# Patient Record
Sex: MALE | Race: WHITE | NOT HISPANIC OR LATINO | Employment: PART TIME | URBAN - METROPOLITAN AREA
[De-identification: names, ages, dates, MRNs, and addresses within clinical notes are randomized per-mention and may not be internally consistent; named-entity substitution may affect disease eponyms.]

---

## 2019-11-01 ENCOUNTER — OFFICE VISIT (OUTPATIENT)
Dept: UROLOGY | Facility: CLINIC | Age: 24
End: 2019-11-01
Payer: MEDICAID

## 2019-11-01 VITALS
BODY MASS INDEX: 34.16 KG/M2 | SYSTOLIC BLOOD PRESSURE: 130 MMHG | HEIGHT: 60 IN | DIASTOLIC BLOOD PRESSURE: 80 MMHG | RESPIRATION RATE: 16 BRPM | WEIGHT: 174 LBS | HEART RATE: 90 BPM

## 2019-11-01 DIAGNOSIS — N39.0 RECURRENT UTI (URINARY TRACT INFECTION): Primary | ICD-10-CM

## 2019-11-01 DIAGNOSIS — R30.0 DYSURIA: ICD-10-CM

## 2019-11-01 PROCEDURE — 99204 OFFICE O/P NEW MOD 45 MIN: CPT | Mod: 25,S$GLB,, | Performed by: SPECIALIST

## 2019-11-01 PROCEDURE — 81000 URINALYSIS NONAUTO W/SCOPE: CPT | Mod: S$GLB,,, | Performed by: SPECIALIST

## 2019-11-01 PROCEDURE — 81000 POCT URINALYSIS (W/MICRO OPTION): ICD-10-PCS | Mod: S$GLB,,, | Performed by: SPECIALIST

## 2019-11-01 PROCEDURE — 99204 PR OFFICE/OUTPT VISIT, NEW, LEVL IV, 45-59 MIN: ICD-10-PCS | Mod: 25,S$GLB,, | Performed by: SPECIALIST

## 2019-11-01 RX ORDER — SUMATRIPTAN 50 MG/1
TABLET, FILM COATED ORAL
Refills: 0 | COMMUNITY
Start: 2019-10-21

## 2019-11-01 RX ORDER — LEVOTHYROXINE SODIUM 75 UG/1
75 TABLET ORAL DAILY
COMMUNITY

## 2019-11-01 NOTE — PROGRESS NOTES
Subjective:       Patient ID: Geetha Gabriel is a 24 y.o. male.    Chief Complaint: Urinary Tract Infection (Pt reports issues with recurrent UTIs since 5 years of age. Pt reports after each abx  treatment UTI symptoms always return. Pt reports burning, frequency, and urgency. Pt reports approximately 15-20 UTIs this past year. )      HPI:  24-year-old female has been dealing with urine current urinary tract infections all her life she states that at age 7 years old she was hospitalized for pyelonephritis.  She says that when he was she was 17 years old she used to use condoms for sexual activity and her UTIs got worse.    When she has a urinary tract infection she experiences some burning with urination urinary frequencies and some lower back pain.  She feels that way today been do her urine does not look infected at all.    She reports some constipation which she deals with on routine basis    She denies any barrier contraceptives no douching and incontinence    Past Medical History:   Past Medical History:   Diagnosis Date    Asthma     Hypoglycemia     Hypothyroid     Migraines        Past Surgical Historical:   Past Surgical History:   Procedure Laterality Date    ADENOIDECTOMY       SECTION      x2    CHOLECYSTECTOMY      TONSILLECTOMY      TYMPANOSTOMY TUBE PLACEMENT          Medications:   Medication List with Changes/Refills   Current Medications    LEVOTHYROXINE (SYNTHROID) 75 MCG TABLET    Take 75 mcg by mouth once daily.    SUMATRIPTAN (IMITREX) 50 MG TABLET    TAKE 1 TABLET PO AT ONSET OF HEADACHE-- MAY REPEAT IN 2 HOURS- MAX OF 2 PER DAY        Past Social History:   Social History     Socioeconomic History    Marital status:      Spouse name: Not on file    Number of children: Not on file    Years of education: Not on file    Highest education level: Not on file   Occupational History    Not on file   Social Needs    Financial resource strain: Not on file    Food  insecurity:     Worry: Not on file     Inability: Not on file    Transportation needs:     Medical: Not on file     Non-medical: Not on file   Tobacco Use    Smoking status: Never Smoker   Substance and Sexual Activity    Alcohol use: Never     Frequency: Never    Drug use: Never    Sexual activity: Yes   Lifestyle    Physical activity:     Days per week: Not on file     Minutes per session: Not on file    Stress: Not on file   Relationships    Social connections:     Talks on phone: Not on file     Gets together: Not on file     Attends Congregational service: Not on file     Active member of club or organization: Not on file     Attends meetings of clubs or organizations: Not on file     Relationship status: Not on file   Other Topics Concern    Not on file   Social History Narrative    Not on file       Allergies:   Review of patient's allergies indicates:   Allergen Reactions    Bactrim [sulfamethoxazole-trimethoprim] Hives        Family History: History reviewed. No pertinent family history.     Review of Systems:  Review of Systems - General ROS: negative  Psychological ROS: negative  Ophthalmic ROS: negative  ENT ROS: negative  Allergy and Immunology ROS: negative  Hematological and Lymphatic ROS: negative  Endocrine ROS: negative  Respiratory ROS: no cough, shortness of breath, or wheezing  Cardiovascular ROS: no chest pain or dyspnea on exertion  Gastrointestinal ROS: no abdominal pain, change in bowel habits, or black or bloody stools  Genito-Urinary ROS: positive for - recurring UTIs  Musculoskeletal ROS: negative  Neurological ROS: no TIA or stroke symptoms  Dermatological ROS: negative     Physical Exam:  General Appearance:    Alert, cooperative, no distress, appears stated age   Head:    Normocephalic, without obvious abnormality, atraumatic   Eyes:    PERRL, conjunctiva/corneas clear, EOM's intact, fundi     benign, both eyes   Ears:    Normal TM's and external ear canals, both ears   Nose:    Nares normal, septum midline, mucosa normal, no drainage    or sinus tenderness   Throat:   Lips, mucosa, and tongue normal; teeth and gums normal   Neck:   Supple, symmetrical, trachea midline, no adenopathy;     thyroid:  no enlargement/tenderness/nodules; no carotid    bruit or JVD   Back:     Symmetric, no curvature, ROM normal, no CVA tenderness   Lungs:     Clear to auscultation bilaterally, respirations unlabored   Chest Wall:    No tenderness or deformity    Heart:    Regular rate and rhythm, S1 and S2 normal, no murmur, rub   or gallop   Breast Exam:    No tenderness, masses, or nipple abnormality   Abdomen:     Soft, non-tender, bowel sounds active all four quadrants,     no masses, no organomegaly   Genitalia:  Deferred   Rectal:    Deferred   Extremities:   Extremities normal, atraumatic, no cyanosis or edema   Pulses:   2+ and symmetric all extremities   Skin:   Skin color, texture, turgor normal, no rashes or lesions   Lymph nodes:   Cervical, supraclavicular, and axillary nodes normal   Neurologic:   CNII-XII intact, normal strength, sensation and reflexes     throughout         Assessment/Plan:       24-year-old female with recurring UTIs  1.  And suspicious that she has interstitial cystitis and wanted treated as such with an IC smart diet aloe vera supplements and Prelief supplements.   2.  Return to see me in clinic in 3 months  3.  If not making any headway am going to do some studies to make sure she does not have reflux.    Problem List Items Addressed This Visit     None      Visit Diagnoses     Recurrent UTI (urinary tract infection)    -  Primary    Dysuria

## 2019-11-11 LAB
BILIRUB UR QL STRIP: NEGATIVE
GLUCOSE UR QL STRIP: NEGATIVE
KETONES UR QL STRIP: NEGATIVE
LEUKOCYTE ESTERASE UR QL STRIP: NEGATIVE
PH, POC UA: 6
POC AMORP, URINE: 0
POC BACTI, URINE: 0
POC BLOOD, URINE: NEGATIVE
POC CASTS, URINE: 0
POC CRYST, URINE: 0
POC EPITH, URINE: 0
POC HCG, URINE: 0
POC HYALIN, URINE: 0 LPF
POC MUCUS, URINE: 0
POC NITRATES, URINE: NEGATIVE
POC OTHER, URINE: 0
POC RBC, URINE: 0 HPF
POC WBC, URINE: 0 HPF
PROT UR QL STRIP: NEGATIVE
SP GR UR STRIP: 1.02 (ref 1–1.03)
UROBILINOGEN UR STRIP-ACNC: 0.2 (ref 0.3–2.2)